# Patient Record
Sex: FEMALE | Race: WHITE | NOT HISPANIC OR LATINO | Employment: OTHER | URBAN - METROPOLITAN AREA
[De-identification: names, ages, dates, MRNs, and addresses within clinical notes are randomized per-mention and may not be internally consistent; named-entity substitution may affect disease eponyms.]

---

## 2020-06-16 DIAGNOSIS — M25.562 LEFT KNEE PAIN, UNSPECIFIED CHRONICITY: Primary | ICD-10-CM

## 2020-06-17 ENCOUNTER — OFFICE VISIT (OUTPATIENT)
Dept: ORTHOPEDICS | Facility: CLINIC | Age: 60
End: 2020-06-17
Payer: COMMERCIAL

## 2020-06-17 ENCOUNTER — HOSPITAL ENCOUNTER (OUTPATIENT)
Dept: RADIOLOGY | Facility: HOSPITAL | Age: 60
Discharge: HOME | End: 2020-06-17
Attending: ORTHOPAEDIC SURGERY
Payer: COMMERCIAL

## 2020-06-17 VITALS — BODY MASS INDEX: 29.43 KG/M2 | HEIGHT: 67 IN | WEIGHT: 187.5 LBS

## 2020-06-17 DIAGNOSIS — M25.552 LEFT HIP PAIN: ICD-10-CM

## 2020-06-17 DIAGNOSIS — M25.562 LEFT KNEE PAIN, UNSPECIFIED CHRONICITY: ICD-10-CM

## 2020-06-17 DIAGNOSIS — M25.552 LEFT HIP PAIN: Primary | ICD-10-CM

## 2020-06-17 DIAGNOSIS — M25.562 ACUTE PAIN OF LEFT KNEE: Primary | ICD-10-CM

## 2020-06-17 PROCEDURE — 99999 PR PBB SHADOW E&M-EST. PATIENT-LVL III: ICD-10-PCS | Mod: PBBFAC,,, | Performed by: ORTHOPAEDIC SURGERY

## 2020-06-17 PROCEDURE — 73562 X-RAY EXAM OF KNEE 3: CPT | Mod: TC,LT

## 2020-06-17 PROCEDURE — 73562 X-RAY EXAM OF KNEE 3: CPT | Mod: 26,LT,, | Performed by: RADIOLOGY

## 2020-06-17 PROCEDURE — 99203 OFFICE O/P NEW LOW 30 MIN: CPT | Mod: S$GLB,,, | Performed by: ORTHOPAEDIC SURGERY

## 2020-06-17 PROCEDURE — 73560 XR KNEE ORTHO LEFT: ICD-10-PCS | Mod: 26,RT,, | Performed by: RADIOLOGY

## 2020-06-17 PROCEDURE — 73560 X-RAY EXAM OF KNEE 1 OR 2: CPT | Mod: TC,RT

## 2020-06-17 PROCEDURE — 99999 PR PBB SHADOW E&M-EST. PATIENT-LVL III: CPT | Mod: PBBFAC,,, | Performed by: ORTHOPAEDIC SURGERY

## 2020-06-17 PROCEDURE — 73502 XR HIP 2 VIEW LEFT: ICD-10-PCS | Mod: 26,LT,, | Performed by: RADIOLOGY

## 2020-06-17 PROCEDURE — 73562 XR KNEE ORTHO LEFT: ICD-10-PCS | Mod: 26,LT,, | Performed by: RADIOLOGY

## 2020-06-17 PROCEDURE — 3008F PR BODY MASS INDEX (BMI) DOCUMENTED: ICD-10-PCS | Mod: CPTII,S$GLB,, | Performed by: ORTHOPAEDIC SURGERY

## 2020-06-17 PROCEDURE — 73502 X-RAY EXAM HIP UNI 2-3 VIEWS: CPT | Mod: 26,LT,, | Performed by: RADIOLOGY

## 2020-06-17 PROCEDURE — 3008F BODY MASS INDEX DOCD: CPT | Mod: CPTII,S$GLB,, | Performed by: ORTHOPAEDIC SURGERY

## 2020-06-17 PROCEDURE — 73560 X-RAY EXAM OF KNEE 1 OR 2: CPT | Mod: 26,RT,, | Performed by: RADIOLOGY

## 2020-06-17 PROCEDURE — 73502 X-RAY EXAM HIP UNI 2-3 VIEWS: CPT | Mod: TC,LT

## 2020-06-17 PROCEDURE — 99203 PR OFFICE/OUTPT VISIT, NEW, LEVL III, 30-44 MIN: ICD-10-PCS | Mod: S$GLB,,, | Performed by: ORTHOPAEDIC SURGERY

## 2020-06-17 RX ORDER — MELOXICAM 15 MG/1
15 TABLET ORAL DAILY
Qty: 30 TABLET | Refills: 1 | Status: SHIPPED | OUTPATIENT
Start: 2020-06-17 | End: 2020-08-17

## 2020-06-17 RX ORDER — ACETAMINOPHEN 500 MG
500 TABLET ORAL EVERY 6 HOURS PRN
COMMUNITY

## 2020-06-17 NOTE — PROGRESS NOTES
Subjective:      Patient ID: Carina Barraza is a 60 y.o. female.    Chief Complaint: Pain of the Left Hip and Pain of the Left Knee    HPI  Carina Barraza is a 60 year old female here with a 3 month history of left knee pain. The patient is a  homemaker. There was not a history of trauma.  The pain is moderate The pain is located in the lateral aspect of the knee. There is  radiation.  The pain radaites to the thigh and hip. Hip pain is lateral and non radiating.  There is catching or locking.   The pain is described as achy. The patient has not had prior surgery. It is aggravated by standing, at rest and walking.  It is alleviated by rest. There is not numbness or tingling of the lower extremity.  There is  back pain at night.  She  has tried Celebrex but not injections. They have helped.  She does not have difficulty getting in or out of a car, getting dressed, or going up or down stairs.  The patient does not use an assistive device.      History reviewed. No pertinent past medical history.  Past Surgical History:   Procedure Laterality Date    LAMINECTOMY       History reviewed. No pertinent family history.  Social History     Socioeconomic History    Marital status:      Spouse name: Not on file    Number of children: Not on file    Years of education: Not on file    Highest education level: Not on file   Occupational History    Not on file   Social Needs    Financial resource strain: Not on file    Food insecurity     Worry: Not on file     Inability: Not on file    Transportation needs     Medical: Not on file     Non-medical: Not on file   Tobacco Use    Smoking status: Never Smoker    Smokeless tobacco: Never Used   Substance and Sexual Activity    Alcohol use: Not on file    Drug use: Not on file    Sexual activity: Not on file   Lifestyle    Physical activity     Days per week: Not on file     Minutes per session: Not on file    Stress: Not on file   Relationships    Social  "connections     Talks on phone: Not on file     Gets together: Not on file     Attends Scientologist service: Not on file     Active member of club or organization: Not on file     Attends meetings of clubs or organizations: Not on file     Relationship status: Not on file   Other Topics Concern    Not on file   Social History Narrative    Not on file     Current Outpatient Medications on File Prior to Visit   Medication Sig Dispense Refill    acetaminophen (TYLENOL) 500 MG tablet Take 500 mg by mouth every 6 (six) hours as needed for Pain.       No current facility-administered medications on file prior to visit.      Review of patient's allergies indicates:  No Known Allergies    Review of Systems   Constitution: Negative for chills, fever and night sweats.   HENT: Negative for hearing loss.    Eyes: Negative for blurred vision and double vision.   Cardiovascular: Negative for chest pain, claudication and leg swelling.   Respiratory: Negative for shortness of breath.    Endocrine: Negative for polydipsia, polyphagia and polyuria.   Hematologic/Lymphatic: Negative for adenopathy and bleeding problem. Does not bruise/bleed easily.   Skin: Negative for poor wound healing.   Gastrointestinal: Negative for diarrhea and heartburn.   Genitourinary: Negative for bladder incontinence.   Neurological: Negative for focal weakness, headaches, numbness, paresthesias and sensory change.   Psychiatric/Behavioral: The patient is not nervous/anxious.    Allergic/Immunologic: Negative for persistent infections.         Objective:      Body mass index is 29.37 kg/m².  Vitals:    06/17/20 1415   Weight: 85.1 kg (187 lb 8 oz)   Height: 5' 7" (1.702 m)         General    Constitutional: She is oriented to person, place, and time. She appears well-developed and well-nourished.   HENT:   Head: Normocephalic and atraumatic.   Eyes: EOM are normal.   Cardiovascular: Normal rate.    Pulmonary/Chest: Effort normal.   Neurological: She is " alert and oriented to person, place, and time.   Psychiatric: She has a normal mood and affect. Her behavior is normal.     General Musculoskeletal Exam   Gait: normal   Pelvic Obliquity: none      Right Knee Exam     Inspection   Alignment:  normal  Erythema: absent  Scars: absent  Swelling: absent  Effusion: absent  Deformity: absent  Bruising: absent    Tenderness   The patient is experiencing no tenderness.     Range of Motion   Extension: 0   Flexion: 130     Tests   Ligament Examination Lachman: normal (-1 to 2mm)   MCL - Valgus: normal (0 to 2mm)  LCL - Varus: normal  Patella   Passive Patellar Tilt: neutral    Other   Sensation: normal    Left Knee Exam     Inspection   Alignment:  normal  Erythema: absent  Scars: absent  Swelling: absent  Effusion: absent  Deformity: absent  Bruising: absent    Tenderness   The patient tender to palpation of the lateral joint line.    Range of Motion   Extension: 0   Flexion: 130     Tests   Meniscus   Khadar:  Lateral - positive  Stability Lachman: normal (-1 to 2mm)   MCL - Valgus: normal (0 to 2mm)  LCL - Varus: normal (0 to 2mm)  Patella   Passive Patellar Tilt: neutral    Other   Sensation: normal    Right Hip Exam     Inspection   Scars: absent  Swelling: absent  Bruising: absent  No deformity of hip.  Quadriceps Atrophy:  Negative  Erythema: absent    Range of Motion   Abduction: 25   Adduction: 20   Extension: 0   Flexion: 100   External rotation: 30   Internal rotation: 25     Tests   Pain w/ forced internal rotation (ADIEL): absent  Stinchfield test: negative    Other   Sensation: normal  Left Hip Exam     Inspection   Scars: absent  Swelling: absent  No deformity of hip.  Quadriceps Atrophy:  negative  Erythema: absent  Bruising: absent    Range of Motion   Abduction: 25   Adduction: 20   Extension: 0   Flexion: 100   External rotation: 30   Internal rotation: 25     Tests   Pain w/ forced internal rotation (ADIEL): absent  Stinchfield test: negative    Other    Sensation: normal      Back (L-Spine & T-Spine) / Neck (C-Spine) Exam   Back exam is normal.      Muscle Strength   Right Lower Extremity   Hip Abduction: 5/5   Hip Adduction: 5/5   Hip Flexion: 5/5   Quadriceps:  5/5   Hamstrin/5   Ankle Dorsiflexion:  5/5   Left Lower Extremity   Hip Abduction: 5/5   Hip Adduction: 5/5   Hip Flexion: 5/5   Quadriceps:  5/5   Hamstrin/5   Ankle Dorsiflexion:  5/5     Reflexes     Left Side  Quadriceps:  2+    Right Side   Quadriceps:  2+    Vascular Exam     Right Pulses  Dorsalis Pedis:      2+          Left Pulses  Dorsalis Pedis:      2+          Capillary Refill  Right Hand: normal capillary refill  Left Hand: normal capillary refill    Edema  Right Upper Leg: absent  Right Lower Leg: absent  Left Upper Leg: absent  Left Lower Leg: absent    Radiographs taken today and reviewed by me demonstrate minimal arthritic change of the bilateral hip(s).There  is not bone destruction.  There is not a fracture.    Radiographs taken today and reviewed by me demonstrate mild arthritic change of the left KNEE(s).There  is not bone destruction.  There is not a fracture.     Assessment:       Encounter Diagnosis   Name Primary?    Acute pain of left knee Yes          Plan:       Carina was seen today for pain and pain.    Diagnoses and all orders for this visit:    Acute pain of left knee    Carina Barraza was given a prescription for Meloxicam.  The patient was given instructions on how to take the medication and side effects were discussed.    F/U in 4 weeks.  Likely inject inject if no better.

## 2020-07-15 ENCOUNTER — OFFICE VISIT (OUTPATIENT)
Dept: ORTHOPEDICS | Facility: CLINIC | Age: 60
End: 2020-07-15
Payer: COMMERCIAL

## 2020-07-15 VITALS — HEIGHT: 67 IN | BODY MASS INDEX: 29.45 KG/M2 | WEIGHT: 187.63 LBS

## 2020-07-15 DIAGNOSIS — M25.562 ACUTE PAIN OF LEFT KNEE: Primary | ICD-10-CM

## 2020-07-15 PROCEDURE — 3008F PR BODY MASS INDEX (BMI) DOCUMENTED: ICD-10-PCS | Mod: CPTII,S$GLB,, | Performed by: ORTHOPAEDIC SURGERY

## 2020-07-15 PROCEDURE — 3008F BODY MASS INDEX DOCD: CPT | Mod: CPTII,S$GLB,, | Performed by: ORTHOPAEDIC SURGERY

## 2020-07-15 PROCEDURE — 20610 DRAIN/INJ JOINT/BURSA W/O US: CPT | Mod: LT,S$GLB,, | Performed by: ORTHOPAEDIC SURGERY

## 2020-07-15 PROCEDURE — 99213 PR OFFICE/OUTPT VISIT, EST, LEVL III, 20-29 MIN: ICD-10-PCS | Mod: 25,S$GLB,, | Performed by: ORTHOPAEDIC SURGERY

## 2020-07-15 PROCEDURE — 20610 PR DRAIN/INJECT LARGE JOINT/BURSA: ICD-10-PCS | Mod: LT,S$GLB,, | Performed by: ORTHOPAEDIC SURGERY

## 2020-07-15 PROCEDURE — 99999 PR PBB SHADOW E&M-EST. PATIENT-LVL III: ICD-10-PCS | Mod: PBBFAC,,, | Performed by: ORTHOPAEDIC SURGERY

## 2020-07-15 PROCEDURE — 99213 OFFICE O/P EST LOW 20 MIN: CPT | Mod: 25,S$GLB,, | Performed by: ORTHOPAEDIC SURGERY

## 2020-07-15 PROCEDURE — 99999 PR PBB SHADOW E&M-EST. PATIENT-LVL III: CPT | Mod: PBBFAC,,, | Performed by: ORTHOPAEDIC SURGERY

## 2020-07-15 RX ORDER — TRIAMCINOLONE ACETONIDE 40 MG/ML
40 INJECTION, SUSPENSION INTRA-ARTICULAR; INTRAMUSCULAR
Status: COMPLETED | OUTPATIENT
Start: 2020-07-15 | End: 2020-07-15

## 2020-07-15 RX ADMIN — TRIAMCINOLONE ACETONIDE 40 MG: 40 INJECTION, SUSPENSION INTRA-ARTICULAR; INTRAMUSCULAR at 03:07

## 2020-07-15 NOTE — PROGRESS NOTES
"Subjective:      Patient ID: Carina Barraza is a 60 y.o. female.    Chief Complaint: Pain of the Left Knee    HPI  Carina Barraza has left knee pain.  The pain is unchanged. The pain is located in the lateral aspect of the knee.  There  is not radiation.   There is not associated stiffness.   There is not catching and locking. The pain is described as dull. The pain is aggravated by walking.  It is alleviated by rest.  There is not associated back pain.  Her history, medications and problem list were reviewed.    Review of Systems   Constitution: Negative for chills, fever and night sweats.   HENT: Negative for hearing loss.    Eyes: Negative for blurred vision and double vision.   Cardiovascular: Negative for chest pain, claudication and leg swelling.   Respiratory: Negative for shortness of breath.    Endocrine: Negative for polydipsia, polyphagia and polyuria.   Hematologic/Lymphatic: Negative for adenopathy and bleeding problem. Does not bruise/bleed easily.   Skin: Negative for poor wound healing.   Musculoskeletal: Positive for joint pain.   Gastrointestinal: Negative for diarrhea and heartburn.   Genitourinary: Negative for bladder incontinence.   Neurological: Negative for focal weakness, headaches, numbness, paresthesias and sensory change.   Psychiatric/Behavioral: The patient is not nervous/anxious.    Allergic/Immunologic: Negative for persistent infections.         Objective:      Body mass index is 29.38 kg/m².  Vitals:    07/15/20 1503   Weight: 85.1 kg (187 lb 9.8 oz)   Height: 5' 7" (1.702 m)           General    Constitutional: She is oriented to person, place, and time. She appears well-developed and well-nourished.   HENT:   Head: Normocephalic and atraumatic.   Eyes: EOM are normal.   Cardiovascular: Normal rate.    Pulmonary/Chest: Effort normal.   Neurological: She is alert and oriented to person, place, and time.   Psychiatric: She has a normal mood and affect. Her behavior is normal. "     General Musculoskeletal Exam   Gait: normal       Right Knee Exam     Inspection   Erythema: absent  Scars: absent  Swelling: absent  Effusion: absent  Deformity: absent  Bruising: absent    Tenderness   The patient is experiencing no tenderness.     Range of Motion   Extension: 0   Flexion: 130     Tests   Ligament Examination Lachman: normal (-1 to 2mm)   MCL - Valgus: normal (0 to 2mm)  LCL - Varus: normal  Patella   Passive Patellar Tilt: neutral    Other   Sensation: normal    Left Knee Exam     Inspection   Erythema: absent  Scars: absent  Swelling: present  Effusion: present  Deformity: absent  Bruising: absent    Tenderness   The patient tender to palpation of the lateral joint line.    Range of Motion   Extension: 0   Flexion: 130     Tests   Meniscus   Khadar:  Lateral - positive  Stability Lachman: normal (-1 to 2mm)   MCL - Valgus: normal (0 to 2mm)  LCL - Varus: normal (0 to 2mm)  Patella   Passive Patellar Tilt: neutral    Other   Sensation: normal    Muscle Strength   Right Lower Extremity   Hip Abduction: 5/5   Quadriceps:  5/5   Hamstrin/5   Left Lower Extremity   Hip Abduction: 5/5   Quadriceps:  5/5   Hamstrin/5     Reflexes     Left Side  Quadriceps:  2+    Right Side   Quadriceps:  2+    Vascular Exam     Right Pulses  Dorsalis Pedis:      2+          Left Pulses  Dorsalis Pedis:      2+          Edema  Right Lower Leg: absent  Left Lower Leg: absent              Assessment:       Encounter Diagnosis   Name Primary?    Acute pain of left knee Yes          Plan:       Carina was seen today for pain.    Diagnoses and all orders for this visit:    Acute pain of left knee    Other orders  -     triamcinolone acetonide injection 40 mg      Treatment options have been discussed.  We have decided to proceed with a  cortico- steroid injection.  The risk of elevated blood glucose and the extremely low risk of infection were discussed. Verbal consent was obtained. .    After time out  was performed and patient ID, side, and site were verified, the left knee  was prepped in the standard sterile fashion.  A 22-gauge needle was introduced into the left knee joint from an alma rosa-lateral site without complication. The left knee(s) was then injected with 40mg of triamcinolone.  Sterile dressing was applied.  The patient was informed that they may resume activities as tolerated. She was instructed to call if there were any problems.     We will see Carina Barraza  back in 4 weeks to see how she has responded.

## 2020-09-14 ENCOUNTER — TELEPHONE (OUTPATIENT)
Dept: INTERNAL MEDICINE | Facility: CLINIC | Age: 60
End: 2020-09-14

## 2020-09-14 DIAGNOSIS — E83.31 HYPOPHOSPHATEMIC RICKETS: Primary | ICD-10-CM

## 2020-09-14 DIAGNOSIS — M90.80 HYPOPHOSPHATEMIC RICKETS: Primary | ICD-10-CM

## 2020-09-14 NOTE — TELEPHONE ENCOUNTER
So he is not an [facility] patient, so we would probably just have to call and make him an appt at the same time with his wife. You can call his wife if you need his information. Thanks!!

## 2020-09-14 NOTE — TELEPHONE ENCOUNTER
This is my future mother-in-law  Her and her  Ramiro Barraza need to be seen by genetics to determine the origin of a PHEX gene mutation that was passed to their daughter who has hypophosphatemia rickets.  A referral to Dr. Vegas is in. Thanks

## 2020-09-16 ENCOUNTER — TELEPHONE (OUTPATIENT)
Dept: GENETICS | Facility: CLINIC | Age: 60
End: 2020-09-16

## 2020-09-16 NOTE — TELEPHONE ENCOUNTER
Unable to schedule over the phone, but their staff put a message into the nurse to schedule these appointments.

## 2020-09-16 NOTE — TELEPHONE ENCOUNTER
----- Message from Adrianne Epstein sent at 9/16/2020 11:39 AM CDT -----  Regarding: apt  Contact: Mulu  Needs Advice    Reason for call: apt ASAP        Communication Preference:  [phone]    Additional Information: Mulu from Dr. Hewitt office called to get Dr. Hewitt father in law scheduled to be seen ASAP please

## 2020-09-16 NOTE — TELEPHONE ENCOUNTER
Spoke with Darryl Magaly regarding msg. Her and her  are interested in being tested for hypophosphatemic rickets. Their daughter who live is the  has this diagnosis and they want to see if they have a family history of this dx. Informed her that I will reach out to our genetic counselor to see if this is something we test for and give her a call back.

## 2020-09-17 ENCOUNTER — TELEPHONE (OUTPATIENT)
Dept: GENETICS | Facility: CLINIC | Age: 60
End: 2020-09-17

## 2020-09-17 NOTE — TELEPHONE ENCOUNTER
----- Message from Rosy Henderson sent at 9/17/2020 12:52 PM CDT -----  Contact: Self [phone]  Returning a phone call.    Who left a message for the patient:  nurse    Do they know what this is regarding:  to see of the provider can see the pt for Hypophosphatemic rickets    Would they like a phone call back or a response via [email or web address]: call back

## 2020-09-17 NOTE — TELEPHONE ENCOUNTER
Spoke with pt regarding msg. Scheduled pt and her  with Dr. Smith on 10/12. Advised her to bring genetic reports to the appt. She verbalized understanding and had no further questions.

## 2020-10-09 ENCOUNTER — TELEPHONE (OUTPATIENT)
Dept: GENETICS | Facility: CLINIC | Age: 60
End: 2020-10-09

## 2020-10-11 NOTE — PROGRESS NOTES
[facility]  [address] [place] [address]    DATE OF CONSULTATION: 10/12/2020    REFERRING PHYSICIAN: Self, Aaareferral    REASON FOR CONSULTATION: We are requested by Aaareferral Self to consult on Carina Barraza regarding the diagnosis, management, and genetic counseling for the findings of family history of x-linked hypophosphatemic rickets. Carina is accompanied to clinic today by her spouse.      HISTORY OF PRESENT ILLNESS:  Carina Barraza is a 60 y.o. female with a family history of hypophosphatemic rickets. Mrs. Barraza's daughter, Jenelle 31y, was identified to have hypophosphatemia at around 1-2y old. She presented with leg bowing. She was diagnosed and managed at [facility]. 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 Her parents were advised to get testing. Mrs. Barraza does not report any symptoms associated with hypophosphatemia. No leg bowing, no stress fractures, no dental abscesses, no short stature, and no hearing loss. She does report some occasional arthritic pain. Otherwise she denies musculoskeletal complaints.  They have provided her genetic testing records today from the . No past labs to evaluate for metabolic abnormalities.    From knee and hip films taken June 2020, no osteopenia reported.       MEDICAL HISTORY:    There are no active problems to display for this patient.      Past Surgical History:   Procedure Laterality Date    LAMINECTOMY         Medications:    Current Outpatient Medications on File Prior to Visit   Medication Sig Dispense Refill    acetaminophen (TYLENOL) 500 MG tablet Take 500 mg by mouth every 6 (six) hours as needed for Pain.      meloxicam (MOBIC) 15 MG tablet TAKE 1 TABLET BY MOUTH EVERY DAY (Patient not taking: Reported on  10/12/2020) 30 tablet 0     No current facility-administered medications on file prior to visit.      Allergies:  Review of patient's allergies indicates:  No Known Allergies    Immunization History:    There is no immunization history on file for this patient.    Pertinent Laboratory Studies: None  I have reviewed the patient's labs.    Pertinent Radiology & Imaging Studies:   2020   XR hip: FINDINGS:  There are no osseous or articular abnormalities.  No acute fractures or dislocations.  SI joints are symmetric and within normal limits.  There is mild increased sclerotic changes of the pubic symphysis is good on the left compared to the right that may be related to pubic symphysis ideas.  Clinical correlation for this is suggested.     Impression:     As above     XR knee:  FINDINGS:  Slightly narrowed tibiofemoral joint spaces.  No fracture or dislocation.  No bone destruction identified     Impression:     See above    DEVELOPMENT:No concerns    REVIEW OF SYSTEMS: A complete review of systems was negative other than as stated above.    FAMILY HISTORY: Please see scanned pedigree in patient's chart under media.  -She has two daughters. One with X-linked hypophosphatemic rickets. The patients's other daughter is asymptomatic and has not had testing  -Mrs. Barraza has two brothers and one sister, they each have two children. None reported to have features of hypophosphatemia.  -Her father is one of 13 siblings. He and one sister are reported to have had leg bowing, Mrs. Barraza thinks it might have been due to malnutrition during war time. He  at 82y from heart disease and lung cancer. He was not reported to have short stature   -Her mother  of cancer at 70y  Ancestry is [place]     SOCIAL HISTORY:   Social History     Socioeconomic History    Marital status:      Spouse name: Not on file    Number of children: Not on file    Years of education: Not on file    Highest education level:  "Not on file   Occupational History    Not on file   Social Needs    Financial resource strain: Not on file    Food insecurity     Worry: Not on file     Inability: Not on file    Transportation needs     Medical: Not on file     Non-medical: Not on file   Tobacco Use    Smoking status: Never Smoker    Smokeless tobacco: Never Used   Substance and Sexual Activity    Alcohol use: Not on file    Drug use: Not on file    Sexual activity: Not on file   Lifestyle    Physical activity     Days per week: Not on file     Minutes per session: Not on file    Stress: Not on file   Relationships    Social connections     Talks on phone: Not on file     Gets together: Not on file     Attends Gnosticism service: Not on file     Active member of club or organization: Not on file     Attends meetings of clubs or organizations: Not on file     Relationship status: Not on file   Other Topics Concern    Not on file   Social History Narrative    Not on file        MEASUREMENTS:  Wt Readings from Last 3 Encounters:   10/12/20 82.8 kg (182 lb 8.7 oz)   07/15/20 85.1 kg (187 lb 9.8 oz)   06/17/20 85.1 kg (187 lb 8 oz)     Ht Readings from Last 3 Encounters:   10/12/20 5' 6.97" (1.701 m)   07/15/20 5' 7" (1.702 m)   06/17/20 5' 7" (1.702 m)       HC Readings from Last 3 Encounters:   HC 52.8cm, between 10-25th percentile for age       EXAM:  General: Size: Normal  Head: Size, shape, symmetry: Normal  Face: Symmetric, nondysmorphic  Eyes: Size, position, spacing, shape and orientation of palpebral fissures: Normal  Ears: size, configuration, position, rotation: normal  Nose: size, configuration, position, rotation: normal  Mouth/Jaw: size, shape, configuration, position: normal  Neck: Configuration: Normal  Arms/Hands: Size, symmetry, proportion, digits, palmar creases: Normal  Legs/Feet: Size, symmetry, proportion, digits: No bowing of LE appreciated.  Back: Spine straight, intact  Skin: Texture: Normal  Musculoskeletal: Range " of motion: normal  Gait: Normal       ASSESSMENT/DISCUSSION:  JACKELIN is a 60 y.o. female with family history of X-linked hypophosphatemic rickets (XLH) due to pathogenic variant in PHEX in the patient's daughter. Mrs. Barraza presents today for genetic counseling and consideration of testing. Based on her reported clinical findings, Mrs. Barraza does not appear to have symptomatic disease if she too has this pathogenic variant.    XLH is a X-linked dominant condition characterized by low levels of phosphate resulting in leg bowing noticeable within first two years of life, although there can be great intrafamilial variation and individuals can make it to adulthood without glaring symptoms. Males have one X and one Y chromosome, while females have two X chromosomes. In males with XLH, their one X chromosome would be the one that harbors the PHEX variant, meaning all his daughters would inherit XLH and none of his sons. In females with XLH only one of their two X chromosomes needs to harbor a PHEX mutation in order to cause the syndrome, meaning with each pregnancy, regardless of the babies gender, has a 50% chance of inheriting XLH. It is also possible that Mrs. Barraza's daughter's PHEX mutation is de leonel, meaning occurring for the first time in her and not inherited from a parent.     Risks and benefits of genetic testing reviewed. Family expresses understanding and their questions have been answered to their satisfaction.    Without a specific diagnosis, I am unable to provide recurrence risk information to the family at this time. Should the etiology of Jackelin's features be genetic, the risk for recurrence in a future pregnancy could be significant.    It was a pleasure to see JACKELIN today.  We would like to see JACKELIN back in Genetics clinic pending the results of testing or sooner as needed. Should any questions or concerns arise following today's visit, we encourage the family to contact the Genetics  Office.    RECOMMENDATIONS/PLAN:   Targeted familial mutation analysis of daughter's PHEX mutation to [facility]    Return to clinic pending the results of testing or sooner as needed.      The approximate physician face-to-face time was 20 minutes. The majority of the time (>50%) was spent on counseling of the patient or coordination of care.    Stefanie Fisher MS                 Genetic Counselor  [facility]    Kami Smith MD  Board-Certified Medical Geneticist  [facility]      EXTERNAL CC:    Noelle Diaz MD  Self, Aaareferral

## 2020-10-12 ENCOUNTER — OFFICE VISIT (OUTPATIENT)
Dept: GENETICS | Facility: CLINIC | Age: 60
End: 2020-10-12
Payer: COMMERCIAL

## 2020-10-12 ENCOUNTER — LAB VISIT (OUTPATIENT)
Dept: LAB | Facility: HOSPITAL | Age: 60
End: 2020-10-12
Attending: MEDICAL GENETICS
Payer: COMMERCIAL

## 2020-10-12 VITALS — WEIGHT: 182.56 LBS | BODY MASS INDEX: 28.65 KG/M2 | HEIGHT: 67 IN

## 2020-10-12 DIAGNOSIS — Z84.89 FAMILY HISTORY OF GENETIC DISEASE: Primary | ICD-10-CM

## 2020-10-12 DIAGNOSIS — Z84.89 FAMILY HISTORY OF GENETIC DISEASE: ICD-10-CM

## 2020-10-12 DIAGNOSIS — M90.80 HYPOPHOSPHATEMIC RICKETS: ICD-10-CM

## 2020-10-12 DIAGNOSIS — E83.31 HYPOPHOSPHATEMIC RICKETS: ICD-10-CM

## 2020-10-12 PROCEDURE — 3008F PR BODY MASS INDEX (BMI) DOCUMENTED: ICD-10-PCS | Mod: CPTII,S$GLB,, | Performed by: MEDICAL GENETICS

## 2020-10-12 PROCEDURE — 99202 OFFICE O/P NEW SF 15 MIN: CPT | Mod: S$GLB,,, | Performed by: MEDICAL GENETICS

## 2020-10-12 PROCEDURE — 99999 PR PBB SHADOW E&M-EST. PATIENT-LVL III: ICD-10-PCS | Mod: PBBFAC,,, | Performed by: MEDICAL GENETICS

## 2020-10-12 PROCEDURE — 36415 COLL VENOUS BLD VENIPUNCTURE: CPT

## 2020-10-12 PROCEDURE — 3008F BODY MASS INDEX DOCD: CPT | Mod: CPTII,S$GLB,, | Performed by: MEDICAL GENETICS

## 2020-10-12 PROCEDURE — 99999 PR PBB SHADOW E&M-EST. PATIENT-LVL III: CPT | Mod: PBBFAC,,, | Performed by: MEDICAL GENETICS

## 2020-10-12 PROCEDURE — 99202 PR OFFICE/OUTPT VISIT, NEW, LEVL II, 15-29 MIN: ICD-10-PCS | Mod: S$GLB,,, | Performed by: MEDICAL GENETICS

## 2020-10-12 NOTE — PROGRESS NOTES
Office Visit - Genetic Counseling Evaluation   Carina Barraza  : 1960  MRN: [id number]    DATE OF SERVICE: 10/12/20  TIME SPENT: 30min    REFERRING PROVIDER: Aaareferral Self    REASON FOR CONSULT:  Our [facility] was asked to evaluate, Mrs. Carina Barraza a 60 y.o. female with a daughter diagnosed with X-linked hypophosphatemia. She came to the appointment with her .     HISTORY OF PRESENTING ILLNESS: Mrs. Barraza's daughter, Jenelle 31y, was identified to have hypophosphatemia at around 1-2y old. She presented with leg bowing. She was diagnosed and managed at [facility]. 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 Her parents were advised to get testing. Mrs. Barraza does not report any symptoms associated with hypophosphatemia. No leg bowing, no stress fractures, no dental abscesses, no short stature, and no hearing loss. She does report some occasional arthritic pain.        MEDICAL HISTORY:    There is no problem list on file for this patient.      Past Surgical History:   Procedure Laterality Date    LAMINECTOMY           FAMILY HISTORY:  Please see scanned pedigree in patient's chart under media.  -She has two daughters. One with the diagnosis of hypophosphatemia. The other daughter is asymptomatic and has not had testing  -Mrs. Barraza has two brothers and one sister, they each have two children. None reported to have features of hypophosphatemia.  -Her dad is one of 13 siblings. He and one sister are reported to have had leg bowing, Mrs. Barraza thinks it might have been due to malnutrition during war time. He  at 82y from heart disease and lung cancer. He was not reported to have short stature   -Her mom  of cancer at 70y  Ancestry is [place]     DISCUSSION & IMPRESSION:  Mrs. Lim  Madi is a 60 y.o. female with a daughter who has a pathogenic PHEX variant, confirming X-linked hypophosphatemia (XLH)    -Reviewed motivation to be seen by genetics.     -Reviewed patient's medical and family history. Discussed basics of genetics and genetics of X-linked hypophosphatemia. XLH is a X-linked dominant condition characterized by low levels of phosphorus in the blood causing issues like extreme leg bowing noticeable within first two years of life, although there can be great intrafamilial variation and individuals can make it to adulthood without glaring symptoms. Males have one X and one Y chromosome, while females have two X chromosomes. In males with XLH, their one X chromosome would be the one that harbors the PHEX variant, meaning all his daughter would inherit XLH and none of his sons. In females with XLH only one of their two X chromosomes needs to harbor a PHEX mutation in order to cause the syndrome, meaning with each pregnancy, regardless of the babies gender, has a 50% chance of inheriting XLH. It is also possible that Mrs. Barraza's daughter's PHEX mutation is de leonel, meaning occurring for the first time in her and not inherited from a parent. Mrs. Barraza was understanding of the information discussed in clinic and all questions were answered. She elected to pursue  genetic testing.    Please see Dr. Smith's note for detailed medical genetics evaluation.    RECOMMENDATIONS:  1. [facility] X-linked Hypophosphatemia test. Blood drawn today. TAT 3-5w    Reference:   Elena QUIROZ. X-Linked Hypophosphatemia. 2012 Feb 9 [Updated 2017 Apr 13]. In: Celso MP, Mariluz HH, Jaja RA, et al., editors. GeneReviews® [Internet]. [place] ([place]): [facility], [place]; 7565-4799. Available from: https://www.ncbi.nlm.nih.gov/books/RRR30795/    Stefanie Fisher MS  Genetic Counselor  [facility]       Kami Smith MD  Medical Geneticist   [facility]

## 2020-10-28 ENCOUNTER — TELEPHONE (OUTPATIENT)
Dept: GENETICS | Facility: CLINIC | Age: 60
End: 2020-10-28

## 2020-10-28 NOTE — TELEPHONE ENCOUNTER
Spoke to Mrs. Barraza to disclose the results of genetic testing. Let her know that she does not harbor the PHEX variant was identified in her daughter. She would like a copy of the results mailed. Confirmed address and will send.    
Detail Level: Detailed
Note Text (......Xxx Chief Complaint.): This diagnosis correlates with the
Other (Free Text): Patient provided GoodRx coupon.

## 2020-11-18 LAB
GENETIC COUNSELING?: YES
GENSO SPECIMEN TYPE: NORMAL
MISCELLANEOUS GENETIC TEST NAME: NORMAL
PARTENTAL OR SIBLING TESTING?: NO
REFERENCE LAB: NORMAL
TEST RESULT: NORMAL

## 2021-04-29 ENCOUNTER — TELEPHONE (OUTPATIENT)
Dept: FAMILY MEDICINE | Facility: CLINIC | Age: 61
End: 2021-04-29

## 2021-04-30 ENCOUNTER — APPOINTMENT (OUTPATIENT)
Dept: RADIOLOGY | Facility: CLINIC | Age: 61
End: 2021-04-30
Attending: FAMILY MEDICINE
Payer: COMMERCIAL

## 2021-04-30 ENCOUNTER — OFFICE VISIT (OUTPATIENT)
Dept: FAMILY MEDICINE | Facility: CLINIC | Age: 61
End: 2021-04-30
Payer: COMMERCIAL

## 2021-04-30 VITALS
RESPIRATION RATE: 18 BRPM | DIASTOLIC BLOOD PRESSURE: 82 MMHG | SYSTOLIC BLOOD PRESSURE: 140 MMHG | HEIGHT: 67 IN | HEART RATE: 72 BPM | WEIGHT: 186.94 LBS | BODY MASS INDEX: 29.34 KG/M2

## 2021-04-30 DIAGNOSIS — M25.571 ACUTE RIGHT ANKLE PAIN: ICD-10-CM

## 2021-04-30 DIAGNOSIS — M25.571 ACUTE RIGHT ANKLE PAIN: Primary | ICD-10-CM

## 2021-04-30 PROCEDURE — 73610 X-RAY EXAM OF ANKLE: CPT | Mod: TC,PO,RT

## 2021-04-30 PROCEDURE — 99203 PR OFFICE/OUTPT VISIT, NEW, LEVL III, 30-44 MIN: ICD-10-PCS | Mod: 25,S$GLB,, | Performed by: FAMILY MEDICINE

## 2021-04-30 PROCEDURE — 3008F PR BODY MASS INDEX (BMI) DOCUMENTED: ICD-10-PCS | Mod: CPTII,S$GLB,, | Performed by: FAMILY MEDICINE

## 2021-04-30 PROCEDURE — 99999 PR PBB SHADOW E&M-EST. PATIENT-LVL III: ICD-10-PCS | Mod: PBBFAC,,, | Performed by: FAMILY MEDICINE

## 2021-04-30 PROCEDURE — 1125F AMNT PAIN NOTED PAIN PRSNT: CPT | Mod: S$GLB,,, | Performed by: FAMILY MEDICINE

## 2021-04-30 PROCEDURE — 96372 PR INJECTION,THERAP/PROPH/DIAG2ST, IM OR SUBCUT: ICD-10-PCS | Mod: S$GLB,,, | Performed by: FAMILY MEDICINE

## 2021-04-30 PROCEDURE — 99999 PR PBB SHADOW E&M-EST. PATIENT-LVL III: CPT | Mod: PBBFAC,,, | Performed by: FAMILY MEDICINE

## 2021-04-30 PROCEDURE — 73610 XR ANKLE COMPLETE 3 VIEW RIGHT: ICD-10-PCS | Mod: 26,RT,, | Performed by: RADIOLOGY

## 2021-04-30 PROCEDURE — 99203 OFFICE O/P NEW LOW 30 MIN: CPT | Mod: 25,S$GLB,, | Performed by: FAMILY MEDICINE

## 2021-04-30 PROCEDURE — 96372 THER/PROPH/DIAG INJ SC/IM: CPT | Mod: S$GLB,,, | Performed by: FAMILY MEDICINE

## 2021-04-30 PROCEDURE — 3008F BODY MASS INDEX DOCD: CPT | Mod: CPTII,S$GLB,, | Performed by: FAMILY MEDICINE

## 2021-04-30 PROCEDURE — 73610 X-RAY EXAM OF ANKLE: CPT | Mod: 26,RT,, | Performed by: RADIOLOGY

## 2021-04-30 PROCEDURE — 1125F PR PAIN SEVERITY QUANTIFIED, PAIN PRESENT: ICD-10-PCS | Mod: S$GLB,,, | Performed by: FAMILY MEDICINE

## 2021-04-30 RX ORDER — DICLOFENAC SODIUM 75 MG/1
75 TABLET, DELAYED RELEASE ORAL 2 TIMES DAILY PRN
Qty: 60 TABLET | Refills: 1 | Status: SHIPPED | OUTPATIENT
Start: 2021-04-30 | End: 2021-05-30

## 2021-04-30 RX ORDER — KETOROLAC TROMETHAMINE 30 MG/ML
30 INJECTION, SOLUTION INTRAMUSCULAR; INTRAVENOUS
Status: COMPLETED | OUTPATIENT
Start: 2021-04-30 | End: 2021-04-30

## 2021-04-30 RX ORDER — METHYLPREDNISOLONE ACETATE 40 MG/ML
60 INJECTION, SUSPENSION INTRA-ARTICULAR; INTRALESIONAL; INTRAMUSCULAR; SOFT TISSUE
Status: COMPLETED | OUTPATIENT
Start: 2021-04-30 | End: 2021-04-30

## 2021-04-30 RX ADMIN — METHYLPREDNISOLONE ACETATE 60 MG: 40 INJECTION, SUSPENSION INTRA-ARTICULAR; INTRALESIONAL; INTRAMUSCULAR; SOFT TISSUE at 03:04

## 2021-04-30 RX ADMIN — KETOROLAC TROMETHAMINE 30 MG: 30 INJECTION, SOLUTION INTRAMUSCULAR; INTRAVENOUS at 03:04

## 2021-07-21 RX ORDER — METRONIDAZOLE 7.5 MG/G
GEL TOPICAL 2 TIMES DAILY
Qty: 45 G | Refills: 5 | Status: SHIPPED | OUTPATIENT
Start: 2021-07-21 | End: 2022-07-21

## 2021-08-11 ENCOUNTER — CLINICAL SUPPORT (OUTPATIENT)
Dept: FAMILY MEDICINE | Facility: CLINIC | Age: 61
End: 2021-08-11
Payer: COMMERCIAL

## 2021-08-11 DIAGNOSIS — Z20.822 ENCOUNTER FOR LABORATORY TESTING FOR COVID-19 VIRUS: ICD-10-CM

## 2021-08-11 DIAGNOSIS — Z01.812 ENCOUNTER FOR SCREENING LABORATORY TESTING FOR COVID-19 VIRUS IN ASYMPTOMATIC PATIENT: Primary | ICD-10-CM

## 2021-08-11 DIAGNOSIS — Z11.52 ENCOUNTER FOR SCREENING LABORATORY TESTING FOR COVID-19 VIRUS IN ASYMPTOMATIC PATIENT: Primary | ICD-10-CM

## 2021-08-11 PROCEDURE — U0005 INFEC AGEN DETEC AMPLI PROBE: HCPCS | Performed by: FAMILY MEDICINE

## 2021-08-11 PROCEDURE — U0003 INFECTIOUS AGENT DETECTION BY NUCLEIC ACID (DNA OR RNA); SEVERE ACUTE RESPIRATORY SYNDROME CORONAVIRUS 2 (SARS-COV-2) (CORONAVIRUS DISEASE [COVID-19]), AMPLIFIED PROBE TECHNIQUE, MAKING USE OF HIGH THROUGHPUT TECHNOLOGIES AS DESCRIBED BY CMS-2020-01-R: HCPCS | Performed by: FAMILY MEDICINE

## 2021-08-12 LAB
SARS-COV-2 RNA RESP QL NAA+PROBE: NOT DETECTED
SARS-COV-2- CYCLE NUMBER: -1

## 2021-11-03 RX ORDER — ONDANSETRON 8 MG/1
8 TABLET, ORALLY DISINTEGRATING ORAL EVERY 8 HOURS PRN
Qty: 30 TABLET | Refills: 2 | Status: SHIPPED | OUTPATIENT
Start: 2021-11-03

## 2021-11-23 DIAGNOSIS — Z20.822 ENCOUNTER FOR LABORATORY TESTING FOR COVID-19 VIRUS: Primary | ICD-10-CM

## 2021-12-07 ENCOUNTER — TELEPHONE (OUTPATIENT)
Dept: FAMILY MEDICINE | Facility: CLINIC | Age: 61
End: 2021-12-07
Payer: COMMERCIAL

## 2023-01-18 ENCOUNTER — TELEPHONE (OUTPATIENT)
Dept: FAMILY MEDICINE | Facility: CLINIC | Age: 63
End: 2023-01-18
Payer: COMMERCIAL

## 2023-01-18 NOTE — TELEPHONE ENCOUNTER
----- Message from Stevie Schwartz sent at 2023  1:44 PM CST -----  Carina Barraza  MRN: [id number]  : 1960  PCP: Noelle Diaz  Home Phone      [phone]  Work Phone      Not on file.  [employer]          [phone]      MESSAGE: est patient w/ Dr Hewitt -- wanting to change PCP to Dr Love -- wants to make appt for wellness    Call [phone]    PCP: ???

## 2023-01-18 NOTE — TELEPHONE ENCOUNTER
Spoke with patient and she states that she is looking for appointment for wellness exam with Dr. Love. Appointment schedule. Patient confirmed.

## 2025-03-02 RX ORDER — METHYLPREDNISOLONE 4 MG/1
TABLET ORAL
Qty: 1 EACH | Refills: 0 | Status: SHIPPED | OUTPATIENT
Start: 2025-03-02

## 2025-03-02 RX ORDER — AZITHROMYCIN 500 MG/1
500 TABLET, FILM COATED ORAL DAILY
Qty: 3 TABLET | Refills: 0 | Status: SHIPPED | OUTPATIENT
Start: 2025-03-02 | End: 2025-03-05